# Patient Record
Sex: MALE | Race: WHITE | NOT HISPANIC OR LATINO | Employment: OTHER | URBAN - METROPOLITAN AREA
[De-identification: names, ages, dates, MRNs, and addresses within clinical notes are randomized per-mention and may not be internally consistent; named-entity substitution may affect disease eponyms.]

---

## 2018-08-26 ENCOUNTER — HOSPITAL ENCOUNTER (EMERGENCY)
Facility: HOSPITAL | Age: 78
Discharge: HOME | End: 2018-08-26
Attending: EMERGENCY MEDICINE
Payer: MEDICARE

## 2018-08-26 VITALS
TEMPERATURE: 97 F | SYSTOLIC BLOOD PRESSURE: 163 MMHG | HEIGHT: 72 IN | HEART RATE: 65 BPM | DIASTOLIC BLOOD PRESSURE: 77 MMHG | BODY MASS INDEX: 17.74 KG/M2 | OXYGEN SATURATION: 98 % | WEIGHT: 131 LBS | RESPIRATION RATE: 16 BRPM

## 2018-08-26 DIAGNOSIS — M19.011 ARTHRITIS OF RIGHT SHOULDER REGION: Primary | ICD-10-CM

## 2018-08-26 PROCEDURE — 63600175 PHARM REV CODE 636 W HCPCS: Performed by: EMERGENCY MEDICINE

## 2018-08-26 PROCEDURE — 25000003 PHARM REV CODE 250: Performed by: EMERGENCY MEDICINE

## 2018-08-26 PROCEDURE — 96372 THER/PROPH/DIAG INJ SC/IM: CPT

## 2018-08-26 PROCEDURE — 99283 EMERGENCY DEPT VISIT LOW MDM: CPT | Mod: 25

## 2018-08-26 RX ORDER — BETAMETHASONE SODIUM PHOSPHATE AND BETAMETHASONE ACETATE 3; 3 MG/ML; MG/ML
6 INJECTION, SUSPENSION INTRA-ARTICULAR; INTRALESIONAL; INTRAMUSCULAR; SOFT TISSUE
Status: COMPLETED | OUTPATIENT
Start: 2018-08-26 | End: 2018-08-26

## 2018-08-26 RX ORDER — AMOXICILLIN AND CLAVULANATE POTASSIUM 875; 125 MG/1; MG/1
1 TABLET, FILM COATED ORAL 2 TIMES DAILY
COMMUNITY

## 2018-08-26 RX ORDER — TRAMADOL HYDROCHLORIDE 50 MG/1
50 TABLET ORAL EVERY 6 HOURS PRN
Qty: 12 TABLET | Refills: 0 | Status: SHIPPED | OUTPATIENT
Start: 2018-08-26 | End: 2018-09-05

## 2018-08-26 RX ORDER — HYDROCODONE BITARTRATE AND ACETAMINOPHEN 5; 325 MG/1; MG/1
1 TABLET ORAL
Status: COMPLETED | OUTPATIENT
Start: 2018-08-26 | End: 2018-08-26

## 2018-08-26 RX ORDER — LISINOPRIL 5 MG/1
5 TABLET ORAL DAILY
COMMUNITY

## 2018-08-26 RX ADMIN — BETAMETHASONE SODIUM PHOSPHATE AND BETAMETHASONE ACETATE 6 MG: 3; 3 INJECTION, SUSPENSION INTRA-ARTICULAR; INTRALESIONAL; INTRAMUSCULAR at 02:08

## 2018-08-26 RX ADMIN — HYDROCODONE BITARTRATE AND ACETAMINOPHEN 1 TABLET: 5; 325 TABLET ORAL at 02:08

## 2018-08-26 NOTE — ED PROVIDER NOTES
Encounter Date: 8/26/2018       History     Chief Complaint   Patient presents with    Shoulder Pain     c/o right shoulder pain; recently bitten by spider on top of right shoulder and treated-feels like joints of shoulder are hurting     70-year-old male with a past medical history of diabetes stroke hypothyroidism presents the emergency department for evaluation of right shoulder pain. The patient has a history of arthritis and bursitis and shoulders.  He believes he was bit by a spider several days ago on the right posterior shoulder, saw nurse practitioner, started him on antibiotics, and he is concerned it is related to the spider bite.  He has no erythema warmth or swelling of the shoulder fevers, nausea vomiting or any other complaints.          Review of patient's allergies indicates:  No Known Allergies  Past Medical History:   Diagnosis Date    Diabetes mellitus     Stroke     Thyroid disease      Past Surgical History:   Procedure Laterality Date    HERNIA REPAIR       History reviewed. No pertinent family history.  Social History     Tobacco Use    Smoking status: Former Smoker   Substance Use Topics    Alcohol use: No    Drug use: No     Review of Systems   Constitutional: Negative for fever.   Respiratory: Negative for cough and shortness of breath.    Cardiovascular: Negative for chest pain.   Gastrointestinal: Negative for abdominal pain, nausea and vomiting.   Musculoskeletal: Positive for arthralgias. Negative for joint swelling.   Skin: Negative for rash and wound.   Neurological: Negative for weakness, numbness and headaches.       Physical Exam     Initial Vitals [08/26/18 0122]   BP Pulse Resp Temp SpO2   (!) 163/77 65 16 97.4 °F (36.3 °C) 98 %      MAP       --         Physical Exam    Nursing note and vitals reviewed.  Constitutional: He appears well-developed and well-nourished. No distress.   HENT:   Head: Normocephalic and atraumatic.   Eyes: Conjunctivae are normal. Pupils are  equal, round, and reactive to light. No scleral icterus.   Neck: Neck supple.   Cardiovascular: Normal rate, regular rhythm and normal heart sounds.   Pulmonary/Chest: He has wheezes. He has no rhonchi. He has no rales.   Abdominal: Soft.   Musculoskeletal:   Full range of motion of the right shoulder.  Complaining mostly of pain over the anterior and posterior glenohumeral joint.  Patient has small lesion right posterior shoulder with a small amount of skin peeling and 1 cm diameter but no erythema induration warmth or drainage   Neurological: He is alert and oriented to person, place, and time. He has normal strength. No sensory deficit.   Skin: Skin is warm and dry.         ED Course   Procedures  Labs Reviewed - No data to display       Imaging Results    None          Medical Decision Making:   Patient appears to have arthritis of the right shoulder.  He may be having side effects of an actual brown recluse spider bite but it does not appear to be infected or septic joint at this time.  I will give him a dose of Decadron and short course of pain medicine.  He needs to follow up with his primary care physician or orthopedist in the next few days.                      Clinical Impression:   The encounter diagnosis was Arthritis of right shoulder region.                             Sy Carbajal MD  08/27/18 [id number]

## 2019-04-06 ENCOUNTER — HOSPITAL ENCOUNTER (EMERGENCY)
Facility: HOSPITAL | Age: 79
Discharge: HOME | End: 2019-04-06
Attending: EMERGENCY MEDICINE
Payer: MEDICARE

## 2019-04-06 VITALS
HEIGHT: 72 IN | DIASTOLIC BLOOD PRESSURE: 64 MMHG | OXYGEN SATURATION: 98 % | WEIGHT: 134 LBS | SYSTOLIC BLOOD PRESSURE: 128 MMHG | HEART RATE: 72 BPM | TEMPERATURE: 98 F | RESPIRATION RATE: 18 BRPM | BODY MASS INDEX: 18.15 KG/M2

## 2019-04-06 DIAGNOSIS — J06.9 UPPER RESPIRATORY TRACT INFECTION, UNSPECIFIED TYPE: Primary | ICD-10-CM

## 2019-04-06 PROCEDURE — 99283 EMERGENCY DEPT VISIT LOW MDM: CPT | Mod: 25

## 2019-04-06 NOTE — ED PROVIDER NOTES
Encounter Date: 4/6/2019    SCRIBE #1 NOTE: I, Janine Lainez, am scribing for, and in the presence of, Deep Aguilar MD.       History     Chief Complaint   Patient presents with    Cough       Time seen by provider: 12:16 PM on 04/06/2019    Sd Sweeney is a 78 y.o. male with DM, thyroid disease, and prior stroke (2015) who presents to the ED with an onset of sore throat, sneezing, and coughing for the past couple of days. The patient reports a positive sick contact (son). He had questions regarding his carotid stenosis, for which he saw Neurologist Dr. Yeison Lu yesterday and was advised to continue anticoagulants, and follow-up with Vascular Surgeon on 4/30. The patient denies fevers or any other symptoms at this time. He has a SHx including a hernia repair. No known drug allergies noted.    The history is provided by the patient.     Review of patient's allergies indicates:  No Known Allergies  Past Medical History:   Diagnosis Date    Diabetes mellitus     Stroke     Thyroid disease      Past Surgical History:   Procedure Laterality Date    HERNIA REPAIR       History reviewed. No pertinent family history.  Social History     Tobacco Use    Smoking status: Former Smoker   Substance Use Topics    Alcohol use: No    Drug use: No     Review of Systems   Constitutional: Negative for fever.   HENT: Positive for sneezing and sore throat.    Respiratory: Positive for cough. Negative for shortness of breath.    Cardiovascular: Negative for chest pain.   Gastrointestinal: Negative for abdominal pain, diarrhea, nausea and vomiting.   Genitourinary: Negative for flank pain.   Skin: Negative for rash.   Neurological: Negative for headaches.     Physical Exam     Initial Vitals [04/06/19 1158]   BP Pulse Resp Temp SpO2   119/68 71 20 97.8 °F (36.6 °C) 98 %      MAP       --         Physical Exam    Nursing note and vitals reviewed.  Constitutional: He appears well-developed and well-nourished. He is not  diaphoretic.  Non-toxic appearance. He does not have a sickly appearance. He does not appear ill. No distress.   [place] [facility].    HENT:   Head: Normocephalic and atraumatic.   Eyes: EOM are normal.   Neck: Normal range of motion. Neck supple. Normal range of motion present. No neck rigidity.   Cardiovascular: Normal rate, regular rhythm and normal heart sounds. Exam reveals no gallop and no friction rub.    No murmur heard.  Pulmonary/Chest: Breath sounds normal. No respiratory distress. He has no wheezes. He has no rhonchi. He has no rales.   Lungs are clear no wheezing   Musculoskeletal: Normal range of motion.   Neurological: He is alert and oriented to person, place, and time.   Symmetrical smile no facial droop.  Extraocular movements intact.  Normal bilateral  strength   Skin: Skin is warm and dry. No rash noted.   Psychiatric: He has a normal mood and affect. His behavior is normal. Judgment and thought content normal.       ED Course   Procedures  Labs Reviewed - No data to display     Imaging Results          X-Ray Chest PA And Lateral (Final result)  Result time 04/06/19 13:00:17    Final result by Jean Marlow Jr., MD (04/06/19 13:00:17)                 Impression:      Aortic ectasia and atherosclerosis.  Chronic elevation of the left hemidiaphragm.      Electronically signed by: Jean Marlow MD  Date:    04/06/2019  Time:    13:00             Narrative:    EXAMINATION:  XR CHEST PA AND LATERAL    CLINICAL HISTORY:  coughign;    TECHNIQUE:  PA and lateral views of the chest were performed.    COMPARISON:  Prior chest of December 16, 2016.    FINDINGS:  There is aortic ectasia and calcification.  The cardiac size is within normal limits.  There is chronic elevation of the left hemidiaphragm.  No intrapulmonary mass or infiltrate is seen.  No pneumothorax or pleural effusion is noted.                                 Medical Decision Making:   History:   Old Medical Records: I decided to obtain old  medical records.  Clinical Tests:   Radiological Study: Ordered and Reviewed            Scribe Attestation:   Scribe #1: I performed the above scribed service and the documentation accurately describes the services I performed. I attest to the accuracy of the note.    I, Dr. Aguilar, personally performed the services described in this documentation. All medical record entries made by the scribe were at my direction and in my presence.  I have reviewed the chart and agree that the record reflects my personal performance and is accurate and complete.1:35 PM 04/06/2019            ED Course as of Apr 06 1320   Sat Apr 06, 2019   1213 SpO2: 98 % [EF]   1213 Resp: 20 [EF]   1213 Pulse: 71 [EF]   1213 Temp src: Oral [EF]   1213 Temp: 97.8 °F (36.6 °C) [EF]   1213 BP: 119/68 [EF]   1312 X-Ray Chest PA And Lateral [EF]   1313 78-year-old presents with URI symptoms including cough rhinorrhea for 1 day. Son sick with similar symptoms. Symptoms almost certainly viral.  No indication for any antibiotics.    [EF]      ED Course User Index  [EF] Deep Aguilar MD     Clinical Impression:       ICD-10-CM ICD-9-CM   1. Upper respiratory tract infection, unspecified type J06.9 465.9         Disposition:   Disposition: Discharged  Condition: Stable                        Deep Aguilar MD  04/06/19 [id number]